# Patient Record
Sex: MALE | Race: OTHER | Employment: FULL TIME | URBAN - METROPOLITAN AREA
[De-identification: names, ages, dates, MRNs, and addresses within clinical notes are randomized per-mention and may not be internally consistent; named-entity substitution may affect disease eponyms.]

---

## 2023-07-15 ENCOUNTER — HOSPITAL ENCOUNTER (EMERGENCY)
Age: 28
Discharge: HOME | End: 2023-07-15
Attending: EMERGENCY MEDICINE
Payer: MEDICAID

## 2023-07-15 VITALS
HEART RATE: 99 BPM | DIASTOLIC BLOOD PRESSURE: 71 MMHG | OXYGEN SATURATION: 98 % | SYSTOLIC BLOOD PRESSURE: 116 MMHG | TEMPERATURE: 97.7 F | RESPIRATION RATE: 17 BRPM

## 2023-07-15 DIAGNOSIS — Z75.8 DOES NOT HAVE PRIMARY CARE PROVIDER: ICD-10-CM

## 2023-07-15 DIAGNOSIS — T50.905A ADVERSE EFFECT OF DRUG, INITIAL ENCOUNTER: Primary | ICD-10-CM

## 2023-07-15 DIAGNOSIS — F12.90 MARIJUANA USE: ICD-10-CM

## 2023-07-15 PROCEDURE — 99283 EMERGENCY DEPT VISIT LOW MDM: CPT

## 2023-07-15 ASSESSMENT — LIFESTYLE VARIABLES
HOW OFTEN DO YOU HAVE A DRINK CONTAINING ALCOHOL: NEVER
HOW MANY STANDARD DRINKS CONTAINING ALCOHOL DO YOU HAVE ON A TYPICAL DAY: PATIENT DOES NOT DRINK

## 2023-07-15 NOTE — ED PROVIDER NOTES
[facility] ENCOUNTER        Pt Name: Rubens Michelle  MRN: [id number]  [address] 1995  Date of evaluation: 7/15/2023  Provider: Andie Berger MD  PCP: No primary care provider on file. Note Started: 4:06 AM EDT 7/15/23    CHIEF COMPLAINT     I have not done edibles before and I did them for the first time and then I started thinking too much  HISTORY OF PRESENT ILLNESS: 1 or more Elements     Chief Complaint   Patient presents with    Other     Pt arrives from home via  ems from home with c/o edible side effects. Pt stated he took 200 mg of an edible around 6pm yesterday. Pt stated he thinks he has a heart thing but has not been diagnosed\"   Pt stated he feels calm but anxious      History from : Patient and Significant Other partner at bedside  Limitations to history : Altered Mental Status    Rubens Michelle is a 32 y.o. male who presents to the emergency department secondary to concern for feeling anxious. He tells me that he has not done any type of marijuana in the past and tonight he tried edibles. He states that he was initially feeling fine but then he started thinking about things like what if he falls asleep but then he is not actually falling asleep and he is dying. He denies any other history of alcohol use or drugs tonight. He denies any pain. Denies any fevers, chills, nausea, vomiting. No abdominal pain. Past medical history noted below. Aside from what is stated above denies any other symptoms or modifying factors. Nursing Notes were all reviewed and agreed with or any disagreements addressed in HPI/MDM. REVIEW OF SYSTEMS :    Review of Systems  Pertinent positive and negative findings as documented in the HPI  PAST MEDICAL HISTORY     Past Medical History:   Diagnosis Date    Constipation     Pneumonia        SURGICALHISTORY     No past surgical history on file.   CURRENT MEDICATIONS       Previous Medications

## 2023-07-15 NOTE — DISCHARGE INSTRUCTIONS
We do recommend not doing marijuana anymore in the future as you have a higher likelihood you will have more \"bad trips\". We have given you a referral to primary care since you do not currently have one listed. This is important for routine health care maintenance. The best next steps for you will be making sure you are staying well-hydrated. Return to emergency department for any new or worsening symptoms or concerns.

## 2024-07-29 ENCOUNTER — HOSPITAL ENCOUNTER (EMERGENCY)
Age: 29
Discharge: PSYCHIATRIC OR SUBSTANCE USE FACILITY | End: 2024-07-30
Attending: EMERGENCY MEDICINE
Payer: COMMERCIAL

## 2024-07-29 DIAGNOSIS — R45.851 SUICIDAL IDEATION: Primary | ICD-10-CM

## 2024-07-29 LAB
ALBUMIN SERPL-MCNC: 4.7 G/DL (ref 3.4–5)
ALBUMIN/GLOB SERPL: 1.7 {RATIO} (ref 1.1–2.2)
ALP SERPL-CCNC: 94 U/L (ref 40–129)
ALT SERPL-CCNC: 18 U/L (ref 10–40)
AMPHETAMINES UR QL SCN>1000 NG/ML: NORMAL
ANION GAP SERPL CALCULATED.3IONS-SCNC: 14 MMOL/L (ref 3–16)
APAP SERPL-MCNC: <5 UG/ML (ref 10–30)
AST SERPL-CCNC: 21 U/L (ref 15–37)
BARBITURATES UR QL SCN>200 NG/ML: NORMAL
BASOPHILS # BLD: 0 K/UL (ref 0–0.2)
BASOPHILS NFR BLD: 0.2 %
BENZODIAZ UR QL SCN>200 NG/ML: NORMAL
BILIRUB SERPL-MCNC: 0.5 MG/DL (ref 0–1)
BILIRUB UR QL STRIP.AUTO: NEGATIVE
BUN SERPL-MCNC: 14 MG/DL (ref 7–20)
CALCIUM SERPL-MCNC: 9.7 MG/DL (ref 8.3–10.6)
CANNABINOIDS UR QL SCN>50 NG/ML: NORMAL
CHLORIDE SERPL-SCNC: 100 MMOL/L (ref 99–110)
CLARITY UR: ABNORMAL
CO2 SERPL-SCNC: 23 MMOL/L (ref 21–32)
COCAINE UR QL SCN: NORMAL
COLOR UR: ABNORMAL
CREAT SERPL-MCNC: 1 MG/DL (ref 0.9–1.3)
DEPRECATED RDW RBC AUTO: 13.4 % (ref 12.4–15.4)
DRUG SCREEN COMMENT UR-IMP: NORMAL
EOSINOPHIL # BLD: 0 K/UL (ref 0–0.6)
EOSINOPHIL NFR BLD: 0.5 %
EPI CELLS #/AREA URNS HPF: NORMAL /HPF (ref 0–5)
ETHANOLAMINE SERPL-MCNC: NORMAL MG/DL (ref 0–0.08)
FENTANYL SCREEN, URINE: NORMAL
GFR SERPLBLD CREATININE-BSD FMLA CKD-EPI: >90 ML/MIN/{1.73_M2}
GLUCOSE SERPL-MCNC: 121 MG/DL (ref 70–99)
GLUCOSE UR STRIP.AUTO-MCNC: NEGATIVE MG/DL
HCT VFR BLD AUTO: 42.9 % (ref 40.5–52.5)
HGB BLD-MCNC: 14.8 G/DL (ref 13.5–17.5)
HGB UR QL STRIP.AUTO: NEGATIVE
KETONES UR STRIP.AUTO-MCNC: 15 MG/DL
LEUKOCYTE ESTERASE UR QL STRIP.AUTO: NEGATIVE
LYMPHOCYTES # BLD: 1.4 K/UL (ref 1–5.1)
LYMPHOCYTES NFR BLD: 17.5 %
MCH RBC QN AUTO: 30.9 PG (ref 26–34)
MCHC RBC AUTO-ENTMCNC: 34.6 G/DL (ref 31–36)
MCV RBC AUTO: 89.3 FL (ref 80–100)
METHADONE UR QL SCN>300 NG/ML: NORMAL
MONOCYTES # BLD: 0.6 K/UL (ref 0–1.3)
MONOCYTES NFR BLD: 7.9 %
NEUTROPHILS # BLD: 5.9 K/UL (ref 1.7–7.7)
NEUTROPHILS NFR BLD: 73.9 %
NITRITE UR QL STRIP.AUTO: NEGATIVE
OPIATES UR QL SCN>300 NG/ML: NORMAL
OXYCODONE UR QL SCN: NORMAL
PCP UR QL SCN>25 NG/ML: NORMAL
PH UR STRIP.AUTO: 6 [PH] (ref 5–8)
PH UR STRIP: 6 [PH]
PLATELET # BLD AUTO: 192 K/UL (ref 135–450)
PMV BLD AUTO: 9.9 FL (ref 5–10.5)
POTASSIUM SERPL-SCNC: 3.6 MMOL/L (ref 3.5–5.1)
PROT SERPL-MCNC: 7.4 G/DL (ref 6.4–8.2)
PROT UR STRIP.AUTO-MCNC: ABNORMAL MG/DL
RBC # BLD AUTO: 4.81 M/UL (ref 4.2–5.9)
RBC #/AREA URNS HPF: NORMAL /HPF (ref 0–4)
SALICYLATES SERPL-MCNC: <0.3 MG/DL (ref 15–30)
SODIUM SERPL-SCNC: 137 MMOL/L (ref 136–145)
SP GR UR STRIP.AUTO: 1.02 (ref 1–1.03)
UA COMPLETE W REFLEX CULTURE PNL UR: ABNORMAL
UA DIPSTICK W REFLEX MICRO PNL UR: YES
URN SPEC COLLECT METH UR: ABNORMAL
UROBILINOGEN UR STRIP-ACNC: 1 E.U./DL
WBC # BLD AUTO: 8 K/UL (ref 4–11)
WBC #/AREA URNS HPF: NORMAL /HPF (ref 0–5)

## 2024-07-29 PROCEDURE — 90714 TD VACC NO PRESV 7 YRS+ IM: CPT | Performed by: PHYSICIAN ASSISTANT

## 2024-07-29 PROCEDURE — 80307 DRUG TEST PRSMV CHEM ANLYZR: CPT

## 2024-07-29 PROCEDURE — 80053 COMPREHEN METABOLIC PANEL: CPT

## 2024-07-29 PROCEDURE — 6360000002 HC RX W HCPCS: Performed by: PHYSICIAN ASSISTANT

## 2024-07-29 PROCEDURE — 80179 DRUG ASSAY SALICYLATE: CPT

## 2024-07-29 PROCEDURE — 90471 IMMUNIZATION ADMIN: CPT | Performed by: PHYSICIAN ASSISTANT

## 2024-07-29 PROCEDURE — 99285 EMERGENCY DEPT VISIT HI MDM: CPT

## 2024-07-29 PROCEDURE — 82077 ASSAY SPEC XCP UR&BREATH IA: CPT

## 2024-07-29 PROCEDURE — 90791 PSYCH DIAGNOSTIC EVALUATION: CPT | Performed by: SOCIAL WORKER

## 2024-07-29 PROCEDURE — 85025 COMPLETE CBC W/AUTO DIFF WBC: CPT

## 2024-07-29 PROCEDURE — 80143 DRUG ASSAY ACETAMINOPHEN: CPT

## 2024-07-29 PROCEDURE — 81001 URINALYSIS AUTO W/SCOPE: CPT

## 2024-07-29 RX ORDER — POLYETHYLENE GLYCOL 3350 17 G
2 POWDER IN PACKET (EA) ORAL
Status: CANCELLED | OUTPATIENT
Start: 2024-07-29

## 2024-07-29 RX ORDER — TRAZODONE HYDROCHLORIDE 50 MG/1
50 TABLET ORAL NIGHTLY PRN
Status: CANCELLED | OUTPATIENT
Start: 2024-07-29

## 2024-07-29 RX ORDER — HYDROXYZINE HYDROCHLORIDE 25 MG/1
50 TABLET, FILM COATED ORAL 3 TIMES DAILY PRN
Status: CANCELLED | OUTPATIENT
Start: 2024-07-29

## 2024-07-29 RX ORDER — LORAZEPAM 1 MG/1
1 TABLET ORAL
Status: CANCELLED | OUTPATIENT
Start: 2024-07-29 | End: 2024-07-30

## 2024-07-29 RX ORDER — ACETAMINOPHEN 325 MG/1
650 TABLET ORAL EVERY 8 HOURS PRN
Status: CANCELLED | OUTPATIENT
Start: 2024-07-29

## 2024-07-29 RX ADMIN — CLOSTRIDIUM TETANI TOXOID ANTIGEN (FORMALDEHYDE INACTIVATED) AND CORYNEBACTERIUM DIPHTHERIAE TOXOID ANTIGEN (FORMALDEHYDE INACTIVATED) 0.5 ML: 5; 2 INJECTION, SUSPENSION INTRAMUSCULAR at 18:29

## 2024-07-29 ASSESSMENT — LIFESTYLE VARIABLES
HOW OFTEN DO YOU HAVE A DRINK CONTAINING ALCOHOL: 2-4 TIMES A MONTH
HOW MANY STANDARD DRINKS CONTAINING ALCOHOL DO YOU HAVE ON A TYPICAL DAY: 1 OR 2

## 2024-07-29 ASSESSMENT — PAIN SCALES - GENERAL: PAINLEVEL_OUTOF10: 0

## 2024-07-29 ASSESSMENT — PAIN - FUNCTIONAL ASSESSMENT: PAIN_FUNCTIONAL_ASSESSMENT: 0-10

## 2024-07-29 NOTE — VIRTUAL HEALTH
Gaurav Ashraf  [id number]  1995     Social Work Behavioral Health Crisis Assessment    07/29/24    Chief Complaint: Pt after an argument with his GF wrote a suicide note and had a panic attack.    HPI: Patient is a 28 y.o.  /  male who presents for depressive symptoms and in need of a mental health exam. Patient presented to the ED on 07/29/24 from community.    Past Psychiatric History:  Previous Diagnoses/symptoms: ADHD and Emotional Behavioral Issues 6-10 yrs old   Previous Suicide Attempts/ self harm : yes in 2018 had an interrupted attempt was planning to sit on the train tracks - decided to check his phone and Pt reports today and yesterday he self harmed cut his thighs and arms \" nothing deep\".  Inpatient psychiatric hospitalizations: no  Current outpatient psychiatric provider: Denies  Current therapist: States not in therapy  Previous psychiatric medication trials: No prior medication trials  Current psychiatric medications: No current psychiatric medications  Family Psychiatric History: dad had a behavioral issues and out bursts     Sleep Hours: 3-4 hrs this past week     Sleep concerns: difficulty attaining sleep    Use of sleep medications: denies    Substance Abuse History:  Tobacco: Endorses smoke cigarettes   Alcohol: Endorses \" occasionally\"  Marijuana: Endorses \" I tried it and had a bad reaction\"  Stimulant: Denies  Opiates: Denies  Benzodiazepine: Denies  Other illicit drug usage: Denies  History of substance/alcohol abuse treatment: Denies    Social History:  Education: 11th grade dropped out    Living Situation/Interest: lives with partner GF   Marital/Committed relationship and parenting hx: single  Occupation: works at [employer]   Legal History/Hx of Violence: Denies currently as a small child had outbursts   Spiritual History: Denies  Psychological trauma, neglect, or abuse: denies hx of trauma/abuse   Access to guns or other weapons: denies having access to

## 2024-07-29 NOTE — ED NOTES
Pt will now be on a hold  Room made safe with non essential equipment removed  Pt removed all clothing and belongings  Placed in gown without ties  Pt belongings inventoried collected    Pt provided non slip socks  Safety tray added  Sitter at bedside.

## 2024-07-29 NOTE — ED PROVIDER NOTES
EMERGENCY MEDICINE ATTENDING NOTE  Varun Evans Jr., CHINO STEWARD, [place]ASANTA        I personally saw the patient and made/approved the management plan and take responsibility for the patient management on Gaurav Ashraf.  All diagnostic, treatment, and disposition decisions were made by myself in conjunction with the advanced practice provider.  I have participated in the medical decision making and directed the treatment plan and disposition of the patient.    For further details of Gaurav Ashraf's emergency department encounter, please see the advanced practice provider's documentation.    I, Varun Evans Jr., CHINO STEWARD, MONSE, am the primary physician provider of record.      CHIEF COMPLAINT  Chief Complaint   Patient presents with    Suicidal     Pt brought in per [facility] Police pt reports stress with his partner and he wrote a letter stating he wanted to kill himself, pt also has superficial cuts to his left forearm.  No current plan for SI, feels sad and depressed.  No HI.       BRIEF HISTORY  Gaurav Ashraf is a 28 y.o. male  who presents to the ED for evaluation of suicidal ideation.  Patient states he is in an open relationship with his girlfriend.  States he found out that she actually did have relations with someone else.  States he could not handle it and became very despondent.  States he wanted to kill himself.  Even wrote a note saying that he wanted to kill himself.  Did make some superficial cuts yesterday and today but states he could not go through with it.  Stating he does not really feel suicidal right now but realizes he needs help.    BRIEF/FOCUSED PHYSICAL EXAMINATION  VITAL SIGNS:    ED Triage Vitals   Enc Vitals Group      BP 07/29/24 1556 138/86      Pulse 07/29/24 1556 89      Respirations 07/29/24 1556 15      Temp --       Temp src --       SpO2 07/29/24 1556 97 %      Weight - Scale 07/29/24 1608 74.8 kg (165 lb)      Height 07/29/24 1608 1.803 m (5' 11\")      Head

## 2024-07-29 NOTE — ED PROVIDER NOTES
[facility] EMERGENCY DEPARTMENT  EMERGENCY DEPARTMENT ENCOUNTER        Pt Name: Gaurav Ashraf  MRN: [id number]  Birthdate 1995  Date of evaluation: 7/29/2024  Provider: Clay Hollis PA-C  PCP: No primary care provider on file.  Note Started: 5:11 PM EDT 7/29/24       I have seen and evaluated this patient with my supervising physician Varun Evans DO.      CHIEF COMPLAINT       Chief Complaint   Patient presents with    Suicidal     Pt brought in per [facility] Police pt reports stress with his partner and he wrote a letter stating he wanted to kill himself, pt also has superficial cuts to his left forearm.  No current plan for SI, feels sad and depressed.  No HI.       HISTORY OF PRESENT ILLNESS: 1 or more Elements     History From: patient  Limitations to history : None    Gaurav Ashraf is a 28 y.o. male who presents to the emergency department after police were called.  Patient currently lives with his partner and apparently left a note about killing himself and partner called patient's mother who called the police and brought him here.  Patient states that his partner and him live together and are in an open relationship and he became jealous yesterday and actually cut his left leg.  These are all superficial wounds that he is unsure of his last tetanus vaccination.  States he has been feeling very jealous about this and then cut his left forearm today.  These again are all superficial.  Denies any physical pain.  He states he was very upset about what happened and left a note but never had a plan to hurt himself and currently does not have a plan to hurt himself.  He states he began thinking about all the people that he would actually hurt if he killed himself and states he would be unable to do this and does not want to hurt himself.  He states that he does have a long history of some auditory hallucinations when he is very stressed and will just hear noises and denies any

## 2024-07-30 ENCOUNTER — HOSPITAL ENCOUNTER (INPATIENT)
Age: 29
LOS: 1 days | Discharge: HOME | DRG: 881 | End: 2024-07-31
Attending: PSYCHIATRY & NEUROLOGY | Admitting: PSYCHIATRY & NEUROLOGY
Payer: COMMERCIAL

## 2024-07-30 VITALS
HEART RATE: 70 BPM | TEMPERATURE: 97.4 F | DIASTOLIC BLOOD PRESSURE: 66 MMHG | OXYGEN SATURATION: 96 % | WEIGHT: 165 LBS | SYSTOLIC BLOOD PRESSURE: 105 MMHG | BODY MASS INDEX: 23.1 KG/M2 | HEIGHT: 71 IN | RESPIRATION RATE: 16 BRPM

## 2024-07-30 PROBLEM — F32.A DEPRESSION, UNSPECIFIED: Status: ACTIVE | Noted: 2024-07-30

## 2024-07-30 PROCEDURE — 99221 1ST HOSP IP/OBS SF/LOW 40: CPT

## 2024-07-30 PROCEDURE — 1240000000 HC EMOTIONAL WELLNESS R&B

## 2024-07-30 PROCEDURE — 99223 1ST HOSP IP/OBS HIGH 75: CPT

## 2024-07-30 PROCEDURE — 6370000000 HC RX 637 (ALT 250 FOR IP)

## 2024-07-30 RX ORDER — POLYETHYLENE GLYCOL 3350 17 G
2 POWDER IN PACKET (EA) ORAL
Status: DISCONTINUED | OUTPATIENT
Start: 2024-07-30 | End: 2024-07-31 | Stop reason: HOSPADM

## 2024-07-30 RX ORDER — TRAZODONE HYDROCHLORIDE 50 MG/1
50 TABLET ORAL NIGHTLY PRN
Status: DISCONTINUED | OUTPATIENT
Start: 2024-07-30 | End: 2024-07-31 | Stop reason: HOSPADM

## 2024-07-30 RX ORDER — ACETAMINOPHEN 325 MG/1
650 TABLET ORAL EVERY 8 HOURS PRN
Status: DISCONTINUED | OUTPATIENT
Start: 2024-07-30 | End: 2024-07-31 | Stop reason: HOSPADM

## 2024-07-30 RX ORDER — CITALOPRAM 20 MG/1
10 TABLET ORAL DAILY
Status: DISCONTINUED | OUTPATIENT
Start: 2024-07-30 | End: 2024-07-31 | Stop reason: HOSPADM

## 2024-07-30 RX ORDER — HYDROXYZINE 50 MG/1
50 TABLET, FILM COATED ORAL 3 TIMES DAILY PRN
Status: DISCONTINUED | OUTPATIENT
Start: 2024-07-30 | End: 2024-07-31 | Stop reason: HOSPADM

## 2024-07-30 RX ORDER — LORAZEPAM 1 MG/1
1 TABLET ORAL
Status: ACTIVE | OUTPATIENT
Start: 2024-07-30 | End: 2024-07-31

## 2024-07-30 RX ADMIN — CITALOPRAM HYDROBROMIDE 10 MG: 20 TABLET ORAL at 17:30

## 2024-07-30 ASSESSMENT — PATIENT HEALTH QUESTIONNAIRE - PHQ9
SUM OF ALL RESPONSES TO PHQ QUESTIONS 1-9: 2
SUM OF ALL RESPONSES TO PHQ9 QUESTIONS 1 & 2: 2
SUM OF ALL RESPONSES TO PHQ QUESTIONS 1-9: 2
1. LITTLE INTEREST OR PLEASURE IN DOING THINGS: SEVERAL DAYS
SUM OF ALL RESPONSES TO PHQ QUESTIONS 1-9: 2
SUM OF ALL RESPONSES TO PHQ9 QUESTIONS 1 & 2: 2
1. LITTLE INTEREST OR PLEASURE IN DOING THINGS: SEVERAL DAYS
SUM OF ALL RESPONSES TO PHQ QUESTIONS 1-9: 2
2. FEELING DOWN, DEPRESSED OR HOPELESS: SEVERAL DAYS

## 2024-07-30 ASSESSMENT — SLEEP AND FATIGUE QUESTIONNAIRES
DO YOU HAVE DIFFICULTY SLEEPING: YES
AVERAGE NUMBER OF SLEEP HOURS: 4
SLEEP PATTERN: DIFFICULTY FALLING ASLEEP;DISTURBED/INTERRUPTED SLEEP
DO YOU HAVE DIFFICULTY SLEEPING: YES
DO YOU USE A SLEEP AID: NO
AVERAGE NUMBER OF SLEEP HOURS: 4
SLEEP PATTERN: DIFFICULTY FALLING ASLEEP;DISTURBED/INTERRUPTED SLEEP
DO YOU USE A SLEEP AID: NO

## 2024-07-30 ASSESSMENT — LIFESTYLE VARIABLES
HOW MANY STANDARD DRINKS CONTAINING ALCOHOL DO YOU HAVE ON A TYPICAL DAY: 1 OR 2
HOW OFTEN DO YOU HAVE A DRINK CONTAINING ALCOHOL: 2-4 TIMES A MONTH

## 2024-07-30 ASSESSMENT — PAIN SCALES - GENERAL: PAINLEVEL_OUTOF10: 0

## 2024-07-30 NOTE — BH NOTE
Patient visible and social on the unit. Started Celexa today - educated on medication, denies any questions or concerns. States mood is euthymic and he is feeling overall improved in mood. Denies SI.

## 2024-07-30 NOTE — PROGRESS NOTES
Gaurav presented to [facility] ED with police after he wrote a letter stating that he wanted to kill himself.     Gaurav has been in an open relationship with his girlfriend for 3 years. He recently became jealous when he found out that she had relations with someone else. He inflicted superficial cuts on his left forearm & left leg and then wrote the note. After he wrote the note, he called a friend to come pick him up.     When his girlfriend went home and found the note that Gaurav left, she called Gaurav' mother & his mother called the police.     Gaurav reports that he has a long history of auditory hallucinations & reports that he mainly hears voices when he is stressed. He denies visual or tactile hallucinations.     Gaurav reports that he struggled with ADHD & behavioral issues from the ages of 6 - 10. He reports that he used to see a therapist when he was younger, but has never been prescribed medications.    Gaurav reports that he has never attempted suicide before & has never had an inpatient psychiatric admission.     Upon admission to this unit, Gaurav is pleasant and friendly. He reports that he has a good support system - his mom and sister live close and his co-workers are \"great\". Gaurav told this writer that he was texting with his girlfriend while he was at [facility], and based on those text messages, he believes that they can work things out. Gaurav told this writer that he feels \"fine\" now and hopes he isn't here for more than a few days. Gaurav did report financial stress and is concerned about missing work. He states that they received an eviction notice last month, but was able to pay the rent. He's nervous that if he misses work, they will receive another notice.     Gaurav denies SI, HI, and AVH.

## 2024-07-30 NOTE — ED NOTES
Transfer Center Handoff for Behavioral Health Transfers      Patient's Current Location: [facility] EMERGENCY DEPARTMENT     Chief Complaint   Patient presents with    Suicidal     Pt brought in per [facility] Police pt reports stress with his partner and he wrote a letter stating he wanted to kill himself, pt also has superficial cuts to his left forearm.  No current plan for SI, feels sad and depressed.  No HI.       Current or History of Violent Behavior: No    Currently in Restraints Now or During this Encounter: No  (Specify if Agitation or self harm is noted in ED?)  If yes, please describe behaviors requiring restraint:             Medical Clearance Documented and Verified in the Chart: No    No Known Allergies     Can Patient Tolerate Lying Flat: Yes    Able to Perform ADLs:  Yes  (Specify if able to ambulate or uses any mobility devices such as cane or walker)  Activity: In bed  Level of Assistance:    Assistive Device:    Miscellaneous Devices:      LABS    CBC:   Lab Results   Component Value Date/Time    WBC 8.0 07/29/2024 06:08 PM    RBC 4.81 07/29/2024 06:08 PM    HGB 14.8 07/29/2024 06:08 PM    HCT 42.9 07/29/2024 06:08 PM    MCV 89.3 07/29/2024 06:08 PM    MCH 30.9 07/29/2024 06:08 PM    MCHC 34.6 07/29/2024 06:08 PM    RDW 13.4 07/29/2024 06:08 PM     07/29/2024 06:08 PM    MPV 9.9 07/29/2024 06:08 PM     CMP:   Lab Results   Component Value Date/Time     07/29/2024 06:08 PM    K 3.6 07/29/2024 06:08 PM     07/29/2024 06:08 PM    CO2 23 07/29/2024 06:08 PM    BUN 14 07/29/2024 06:08 PM    CREATININE 1.0 07/29/2024 06:08 PM    AGRATIO 1.7 07/29/2024 06:08 PM    LABGLOM >90 07/29/2024 06:08 PM    GLUCOSE 121 07/29/2024 06:08 PM    CALCIUM 9.7 07/29/2024 06:08 PM    BILITOT 0.5 07/29/2024 06:08 PM    ALKPHOS 94 07/29/2024 06:08 PM    AST 21 07/29/2024 06:08 PM    ALT 18 07/29/2024 06:08 PM     Drug Panel: No results found for: \"AMPHETAMUR\", \"BARBITURATUR\", \"COCAINEUR\",

## 2024-07-30 NOTE — ED NOTES
Assumed care to given report. Report given to [facility] to be transferred to [facility].

## 2024-07-30 NOTE — PROGRESS NOTES
CSSR-S Assessment completed with patient who then scored HIGH RISK.     Provider Dr. Leone was notified of HIGH RISK score, via Telephone at 0247.      Were suicide precautions ordered: NO    If not ordered, justification as follows: Gaurav denies current suicidal ideation, plan, and intent. He states that he believes that he can demonstrate safe behavior on this unit and is willing to reach out to staff if he feels that he cannot maintain safe behavior at any point during this admission.     Completed by: Rhonda AGUERO RN

## 2024-07-30 NOTE — GROUP NOTE
Group Therapy Note    Date: 7/30/2024    Group Start Time: 1100  Group End Time: 1145  Group Topic: Psychoeducation    [facility]    Denise Seymour RT        Group Therapy Note    Attendees: 12    Psych Education session was centered on third spaces and the many wellness benefits they provide.  Group processed what a third space is as well as various examples of indoor, outdoor, and digital third spaces.  Participants were given a handout to self reflect on current places they enjoy as well as healthy activities of interest.  Group discussed how group therapy is a third space and how to implement more third spaces into their lives.      Notes:  Pt was present and engaged across session.  Fully participated in activity and shared input during group discussion.  Receptive to information and met goal for group.    Status After Intervention:  Improved    Participation Level: Active Listener and Interactive    Participation Quality: Appropriate, Attentive, and Sharing      Speech:  normal      Thought Process/Content: Logical      Affective Functioning: Congruent      Mood: depressed      Level of consciousness:  Alert, Oriented x4, and Attentive      Response to Learning: Able to verbalize current knowledge/experience, Able to verbalize/acknowledge new learning, Capable of insight, and Progressing to goal      Endings: None Reported    Modes of Intervention: Education, Support, Socialization, Exploration, Clarifying, Problem-solving, and Activity      Discipline Responsible: Psychoeducational Specialist and Recreational Therapist      Signature:  Denise Seymour MA, CTRS

## 2024-07-30 NOTE — PROGRESS NOTES
Home Medication Reconciliation Status          [x] COMPLETE       Medication history has been reviewed and obtained from the following source(s):       [x] patient/family verbal report             [] patient/family provided written list       [] external pharmacy   [] external facility list         []  Provider notified that home medication reconciliation is complete          [] IN PROGRESS       Medication reconciliation marked in progress at this time due to:       [] patient/family poor historian      [] waiting arrival of family to clarify       [] waiting for accurate list        [] external pharmacy needs called      * Follow up is needed.          [] UNABLE TO ASSESS       Medication reconciliation is incomplete and unable to assess at this time due to:       [] critical patient condition   [] patient is unresponsive        [] no family available                       [] unknown pharmacy       [] anonymous patient          * Follow up is needed.      [] Pharmacy consult placed for medication reconciliation assistance   Additional comments: Gaurav reports that he is not currently prescribed medications and therefore does not take any medications.

## 2024-07-30 NOTE — GROUP NOTE
Group Therapy Note    Date: 7/30/2024    Group Start Time: 1000  Group End Time: 1050  Group Topic: Psychoeducation    [facility]    Sendy Espitia LISW        Group Therapy Note    Attendees: 11       Patient's Goal:  to learn and discuss the importance of having a support system after discharge. Pt's asked to identify their support systems and also given resources for support after discharge.     Notes:  pt attended group for the full duration. He actively participated in group discussion and was able to apply to himself.    Status After Intervention:  Improved    Participation Level: Active Listener and Interactive    Participation Quality: Appropriate, Attentive, and Sharing      Speech:  normal      Thought Process/Content: Logical      Affective Functioning: Congruent      Mood: depressed      Level of consciousness:  Alert, Oriented x4, and Attentive      Response to Learning: Able to verbalize current knowledge/experience, Able to verbalize/acknowledge new learning, and Progressing to goal      Endings: None Reported    Modes of Intervention: Education, Support, Socialization, Exploration, and Clarifying      Discipline Responsible: /Counselor      Signature:  MARGO Beard

## 2024-07-30 NOTE — ED NOTES
Report called to Leroy YOUNG. Pt transferring to room 2316 at [facility] per [facility].

## 2024-07-30 NOTE — PROGRESS NOTES
Gaurav arrived on this unit from [facility] ED with two medical transporters and one security staff from this facility at 0140. Gaurav is ambulatory with a steady gait upon arrival. Security staff performed a thorough assessment of Gaurav and his belongings with a metal detector wand and no contraband was found in his belongings or on his person. Gaurav is alert and oriented x4, calm, and cooperative. He denies current suicidal and homicidal ideation as well as auditory, visual, and tactile hallucinations. Gaurav was provided with a snack and fluids, per his request. This writer oriented Gaurav to this unit and his room. Gaurav is agreeable to participate in the admission process.

## 2024-07-30 NOTE — PROGRESS NOTES
Behavioral Services  Medicare Certification Upon Admission    I certify that this patient's inpatient psychiatric hospital admission is medically necessary for:    [x] (1) Treatment which could reasonably be expected to improve this patient's condition,       [x] (2) Or for diagnostic study;     AND     [x](2) The inpatient psychiatric services are provided while the individual is under the care of a physician and are included in the individualized plan of care.    Estimated length of stay/service 2-5 days    Plan for post-hospital care outpatient support    Electronically signed by PATRICK Burns CNP on 7/30/2024 at 9:08 AM

## 2024-07-30 NOTE — H&P
INITIAL PSYCHIATRIC HISTORY AND PHYSICAL      Patient name: Gaurav Ashraf  Admit date: 7/30/2024  Today's date: 7/30/2024           CC:  Depression    HPI:   Patient seen in room on Adult Behavioral Unit.   Patient is a 28 y.o. male who presents  to  ED  for depressive symptoms and in need of a mental health exam. Per Tele-health notes:  \"Pt was brought in by a , pt reports he felt his relationship with gf was ending and they have been together for 3 yrs through different challenges in life ie: homelessness. Pt reports after an argument with his live in  over their open relationship agreement. Pt became jealous of her having a new interest and he also sought new interest. GF found new interest at there apartment, also became upset.Pt reports that GF stated it was over and left the apartment and returned to a girlfriends home..   Pt endorses he then wrote a suicide note to the gf.     Pt endorses he wrote he was going to kill himself and genuinely felt that way at the time. Pt endorses leaving the home after writing the note and sat alone thinking of various ways and intent to kill himself\" I didn't have a plan and I kept thinking of ways to do it\". Pt endorses a severe panic attack during this time and states \" It hard to reason with me and I get emotional and I want everything ok and I panic more, hard to breathe and in those moments I am over emotional\". Pt endorses he wasn't in a good space over the past week. Pt endorses this week decreased sleep of 3-4 hours. Pt has been cutting on yesterday with a razor to his thighs and today with a razor to arm . Pt endorses theses are superficial but during the time he was cutting it was an intent to harm himself but couldn't bring himself to cu deeper. Pt then changed and  he was cutting to feel better.  Pt has a psychiatric hx of ADHD and Behavior issues at age 6-10 last treated. Pt has a hx of self injurious behavior and reported last time self 
07:55 PM       CULTURES  Results       ** No results found for the last 336 hours. **            EKG:    No results found for this or any previous visit (from the past 4464 hour(s)).     RADIOLOGY  No orders to display         ASSESSMENT/PLAN:  Suicidal ideation  Depression   - per psychiatry team    Self inflicted lacerations to left forearm and thigh  - no laceration repair needed per ED, no s/sx of infection   - given tdap in ED    Pt has no medical complaints at this time. They were informed that should a medical concern arise during their admission they may have [facility] contact us.      Mery Lange PA-C   7/30/2024 2:13 PM

## 2024-07-30 NOTE — PLAN OF CARE
Problem: Depression/Self Harm  Goal: Effect of psychiatric condition will be minimized and patient will be protected from self harm  Description: INTERVENTIONS:  1. Assess impact of patient's symptoms on level of functioning, self care needs and offer support as indicated  2. Assess patient/family knowledge of depression, impact on illness and need for teaching  3. Provide emotional support, presence and reassurance  4. Assess for possible suicidal thoughts or ideation. If patient expresses suicidal thoughts or statements do not leave alone, initiate Suicide Precautions, move to a room close to the nursing station and obtain sitter  5. Initiate consults as appropriate with Mental Health Professional, Spiritual Care, Psychosocial CNS, and consider a recommendation to the LIP for a Psychiatric Consultation  Outcome: Progressing     Patient denies suicidal ideation. Q15 minute checks continue per policy for patient safety.

## 2024-07-30 NOTE — PLAN OF CARE
Problem: Self Harm/Suicidality  Goal: Will have no self-injury during hospital stay  Description: INTERVENTIONS:  1.  Ensure constant observer at bedside with Q15M safety checks  2.  Maintain a safe environment  3.  Secure patient belongings  4.  Ensure family/visitors adhere to safety recommendations  5.  Ensure safety tray has been added to patient's diet order  6.  Every shift and PRN: Re-assess suicidal risk via Frequent Screener    Outcome: Completed     Problem: Anxiety  Goal: Will report anxiety at manageable levels  Description: INTERVENTIONS:  1. Administer medication as ordered  2. Teach and rehearse alternative coping skills  3. Provide emotional support with 1:1 interaction with staff  Outcome: Progressing     Problem: Coping  Goal: Pt/Family able to verbalize concerns and demonstrate effective coping strategies  Description: INTERVENTIONS:  1. Assist patient/family to identify coping skills, available support systems and cultural and spiritual values  2. Provide emotional support, including active listening and acknowledgement of concerns of patient and caregivers  3. Reduce environmental stimuli, as able  4. Instruct patient/family in relaxation techniques, as appropriate  5. Assess for spiritual pain/suffering and initiate Spiritual Care, Psychosocial Clinical Specialist consults as needed  Outcome: Progressing     Problem: Depression/Self Harm  Goal: Effect of psychiatric condition will be minimized and patient will be protected from self harm  Description: INTERVENTIONS:  1. Assess impact of patient's symptoms on level of functioning, self care needs and offer support as indicated  2. Assess patient/family knowledge of depression, impact on illness and need for teaching  3. Provide emotional support, presence and reassurance  4. Assess for possible suicidal thoughts or ideation. If patient expresses suicidal thoughts or statements do not leave alone, initiate Suicide Precautions, move to a room

## 2024-07-30 NOTE — PLAN OF CARE
[facility]  Treatment Team Note  Review Date & Time: 07/30/24  0955    Patient was not in treatment team      Status EXAM:   Mental Status and Behavioral Exam  Normal: No  Level of Assistance: Independent/Self  Facial Expression: Worried, Flat  Affect: Congruent  Level of Consciousness: Alert  Frequency of Checks: 4 times per hour, close  Mood:Normal: No  Mood: Depressed, Anxious  Motor Activity:Normal: Yes  Eye Contact: Good  Observed Behavior: Cooperative, Friendly  Sexual Misconduct History: Current - no  Preception: [place] to person, [place] to time, [place] to place, [place] to situation  Attention:Normal: Yes  Thought Processes: Unremarkable  Thought Content:Normal: Yes  Depression Symptoms: Feelings of helplessness, Feelings of hopelessess, Appetite change, Sleep disturbance  Anxiety Symptoms: Generalized  Felisha Symptoms: No problems reported or observed.  Hallucinations: None (Gaurav denies; not noted to be RTIS)  Delusions: No  Memory:Normal: Yes  Insight and Judgment: No  Insight and Judgment: Poor judgment, Poor insight      Suicide Risk CSSR-S:  1) Within the past month, have you wished you were dead or wished you could go to sleep and not wake up? : Yes  2) Have you actually had any thoughts of killing yourself? : Yes  3) Have you been thinking about how you might kill yourself? : Yes  5) Have you started to work out or worked out the details of how to kill yourself? Do you intend to carry out this plan? : No  6) Have you ever done anything, started to do anything, or prepared to do anything to end your life?: Yes      PLAN/TREATMENT RECOMMENDATIONS UPDATE:   Patient will take medication as prescribed, eat 75% of meals, attend groups, participate in milieu activities, participate in treatment team and care planning for discharge and follow up.           Maday Keenan RN

## 2024-07-30 NOTE — PROGRESS NOTES
4 Eyes Skin Assessment     NAME:  Gaurav Ashraf  YOB: 1995  MEDICAL RECORD NUMBER:  [id number]    The patient is being assessed for  Admission    I agree that at least one RN has performed a thorough Head to Toe Skin Assessment on the patient. ALL assessment sites listed below have been assessed.      Areas assessed by both nurses:    Head, Face, Ears, Shoulders, Back, Chest, Arms, Elbows, Hands, Sacrum. Buttock, Coccyx, Ischium, and Legs. Feet and Heels        Does the Patient have a Wound? No noted wound(s)       Jesús Prevention initiated by RN: No  Wound Care Orders initiated by RN: No    Pressure Injury (Stage 3,4, Unstageable, DTI, NWPT, and Complex wounds) if present, place Wound referral order by RN under : No    New Ostomies, if present place, Ostomy referral order under : No     Nurse 1 eSignature: Electronically signed by Rhonda Kingston RN on 7/30/24 at 3:21 AM EDT    **SHARE this note so that the co-signing nurse can place an eSignature**    Nurse 2 eSignature: Electronically signed by Yuko Toledo RN on 7/30/24 at 3:48 AM EDT

## 2024-07-30 NOTE — PROGRESS NOTES
[facility]  Admission Note     Admission Type:   Admission Type: Voluntary    Reason for admission:  Reason for Admission: Suicidal Ideation      Addictive Behavior:   Addictive Behavior  In the Past 3 Months, Have You Felt or Has Someone Told You That You Have a Problem With  : None    Medical Problems:   Past Medical History:   Diagnosis Date    Constipation     Pneumonia        Status EXAM:  Mental Status and Behavioral Exam  Normal: No  Level of Assistance: Independent/Self  Facial Expression: Brightened, Worried  Affect: Appropriate  Level of Consciousness: Alert  Frequency of Checks: 4 times per hour, close  Mood:Normal: No  Mood: Depressed, Anxious  Motor Activity:Normal: Yes  Eye Contact: Good  Observed Behavior: Cooperative, Friendly  Sexual Misconduct History: Current - no  Preception: [place] to person, [place] to time, [place] to place, [place] to situation  Attention:Normal: Yes  Thought Processes: Unremarkable  Thought Content:Normal: Yes  Depression Symptoms: Feelings of helplessness, Feelings of hopelessess, Appetite change, Sleep disturbance  Anxiety Symptoms: Generalized  Felisha Symptoms: No problems reported or observed.  Hallucinations: None  Delusions: No  Memory:Normal: Yes  Insight and Judgment: No  Insight and Judgment: Poor judgment, Poor insight    Tobacco Screening:  Practical Counseling, on admission, emily X, if applicable and completed (first 3 are required if patient doesn't refuse):            ( ) Recognizing danger situations (included triggers and roadblocks)                    ( ) Coping skills (new ways to manage stress,relaxation techniques, changing routine, distraction)                                                           ( ) Basic information about quitting (benefits of quitting, techniques in how to quit, available resources  ( ) [facility]

## 2024-07-31 VITALS
HEIGHT: 71 IN | BODY MASS INDEX: 23.1 KG/M2 | OXYGEN SATURATION: 99 % | RESPIRATION RATE: 16 BRPM | HEART RATE: 72 BPM | DIASTOLIC BLOOD PRESSURE: 81 MMHG | TEMPERATURE: 98.2 F | SYSTOLIC BLOOD PRESSURE: 131 MMHG | WEIGHT: 165 LBS

## 2024-07-31 PROCEDURE — 6370000000 HC RX 637 (ALT 250 FOR IP)

## 2024-07-31 PROCEDURE — 5130000000 HC BRIDGE APPOINTMENT

## 2024-07-31 PROCEDURE — 99239 HOSP IP/OBS DSCHRG MGMT >30: CPT

## 2024-07-31 RX ORDER — CITALOPRAM HYDROBROMIDE 10 MG/1
10 TABLET ORAL DAILY
Qty: 30 TABLET | Refills: 0 | Status: SHIPPED | OUTPATIENT
Start: 2024-08-01

## 2024-07-31 RX ADMIN — CITALOPRAM HYDROBROMIDE 10 MG: 20 TABLET ORAL at 09:05

## 2024-07-31 NOTE — PLAN OF CARE
Problem: Anxiety  Goal: Will report anxiety at manageable levels  Description: INTERVENTIONS:  1. Administer medication as ordered  2. Teach and rehearse alternative coping skills  3. Provide emotional support with 1:1 interaction with staff  Outcome: Progressing     Problem: Coping  Goal: Pt/Family able to verbalize concerns and demonstrate effective coping strategies  Description: INTERVENTIONS:  1. Assist patient/family to identify coping skills, available support systems and cultural and spiritual values  2. Provide emotional support, including active listening and acknowledgement of concerns of patient and caregivers  3. Reduce environmental stimuli, as able  4. Instruct patient/family in relaxation techniques, as appropriate  5. Assess for spiritual pain/suffering and initiate Spiritual Care, Psychosocial Clinical Specialist consults as needed  Outcome: Progressing     Problem: Depression/Self Harm  Goal: Effect of psychiatric condition will be minimized and patient will be protected from self harm  Description: INTERVENTIONS:  1. Assess impact of patient's symptoms on level of functioning, self care needs and offer support as indicated  2. Assess patient/family knowledge of depression, impact on illness and need for teaching  3. Provide emotional support, presence and reassurance  4. Assess for possible suicidal thoughts or ideation. If patient expresses suicidal thoughts or statements do not leave alone, initiate Suicide Precautions, move to a room close to the nursing station and obtain sitter  5. Initiate consults as appropriate with Mental Health Professional, Spiritual Care, Psychosocial CNS, and consider a recommendation to the [facility] for a Psychiatric Consultation  7/30/2024 2213 by Milan Barnard, RN  Outcome: Progressing  Flowsheets (Taken 7/30/2024 2209)  Effect of psychiatric condition will be minimized and patient will be protected from self harm:   Assess impact of patient’s symptoms on level of

## 2024-07-31 NOTE — TRANSITION OF CARE
[facility] Transition Record    Patient Name: Gaurav Ashraf  YOB: 1995   Medical Record Number: [id number]  Date of Admission: 7/30/2024  1:43 AM   Date of Discharge: 7/31/2024    Attending Provider: Alcon Vieira MD   Discharging Provider:    Marli Wiley APRN - CNP     To contact this individual call [phone] and ask the  to page.  If unavailable, ask to be transferred to Behavioral Health Provider on call.  A Behavioral Health Provider will be available on call 24/7 and during holidays.    Primary Care Provider: No primary care provider on file.    No Known Allergies    Reason for Admission: Gaurav Ashraf is a 28 y.o. male who presents to the emergency department after police were called.  Patient currently lives with his partner and apparently left a note about killing himself and partner called patient's mother who called the police and brought him here.  Patient states that his partner and him live together and are in an open relationship and he became jealous yesterday and actually cut his left leg.  These are all superficial wounds that he is unsure of his last tetanus vaccination.  States he has been feeling very jealous about this and then cut his left forearm today.  These again are all superficial.  Denies any physical pain.  He states he was very upset about what happened and left a note but never had a plan to hurt himself and currently does not have a plan to hurt himself.  He states he began thinking about all the people that he would actually hurt if he killed himself and states he would be unable to do this and does not want to hurt himself.  He states that he does have a long history of some auditory hallucinations when he is very stressed and will just hear noises and denies any change in this.  Denies visual hallucinations or homicidal ideation.  He states he used to see a therapist when he was younger but was never on any medication and does not have

## 2024-07-31 NOTE — BH NOTE
[facility]  Discharge Note    Pt discharged with followings belongings:   Dental Appliances: None  Vision - Corrective Lenses: Eyeglasses  Hearing Aid: None  Jewelry: None  Body Piercings Removed: N/A  Clothing: Pants, Shirt, Jacket/Coat, Footwear, Undergarments  Other Valuables: Wallet, Keys, Credit/Debit Card   Valuables  returned to patient. Patient educated on aftercare instructions: yes  .Patient verbalize understanding of AVS:  yes.    Status EXAM upon discharge:  Mental Status and Behavioral Exam  Normal: Yes  Level of Assistance: Independent/Self  Facial Expression: Brightened  Affect: Appropriate  Level of Consciousness: Alert  Frequency of Checks: 4 times per hour, close  Mood:Normal: Yes  Mood: Other (comment) (euthymic)  Motor Activity:Normal: Yes  Motor Activity: Increased  Eye Contact: Good  Observed Behavior: Cooperative, Friendly  Sexual Misconduct History: Current - no  Preception: [place] to person, [place] to time, [place] to place, [place] to situation  Attention:Normal: Yes  Attention: Unable to concentrate  Thought Processes: Unremarkable  Thought Content:Normal: Yes  Thought Content: Poverty of content  Depression Symptoms: No problems reported or observed.  Anxiety Symptoms: Generalized  Felisha Symptoms: No problems reported or observed.  Hallucinations: None  Delusions: No  Memory:Normal: Yes  Insight and Judgment: No  Insight and Judgment: Poor judgment    Tobacco Screening:  Practical Counseling, on admission, emily X, if applicable and completed (first 3 are required if patient doesn't refuse):            ( ) Recognizing danger situations (included triggers and roadblocks)                    ( ) Coping skills (new ways to manage stress,relaxation techniques, changing routine, distraction)                                                           ( ) Basic information about quitting (benefits of quitting, techniques in how to quit, available resources  ( ) Referral for

## 2024-07-31 NOTE — BH NOTE
Bridge Appointment completed: Reviewed Discharge Instructions with patient.    Patient verbalizes understanding and agreement with the discharge plan using the teachback method.         Vaccinations (emily X if applicable and completed):  ( ) Patient states already received influenza vaccine elsewhere  ( ) Patient received influenza vaccine during this hospitalization  ( ) Patient refused influenza vaccine at this time  (x ) Not offered

## 2024-07-31 NOTE — PLAN OF CARE
Problem: Anxiety  Goal: Will report anxiety at manageable levels  Description: INTERVENTIONS:  1. Administer medication as ordered  2. Teach and rehearse alternative coping skills  3. Provide emotional support with 1:1 interaction with staff  7/31/2024 1357 by Bronwyn Koenig RN  Outcome: Completed  7/31/2024 0945 by Bronwyn Koenig RN  Outcome: Progressing     Problem: Coping  Goal: Pt/Family able to verbalize concerns and demonstrate effective coping strategies  Description: INTERVENTIONS:  1. Assist patient/family to identify coping skills, available support systems and cultural and spiritual values  2. Provide emotional support, including active listening and acknowledgement of concerns of patient and caregivers  3. Reduce environmental stimuli, as able  4. Instruct patient/family in relaxation techniques, as appropriate  5. Assess for spiritual pain/suffering and initiate Spiritual Care, Psychosocial Clinical Specialist consults as needed  7/31/2024 1357 by Bronwyn Koenig RN  Outcome: Completed  7/31/2024 0945 by Bronwyn Koenig RN  Outcome: Progressing     Problem: Depression/Self Harm  Goal: Effect of psychiatric condition will be minimized and patient will be protected from self harm  Description: INTERVENTIONS:  1. Assess impact of patient's symptoms on level of functioning, self care needs and offer support as indicated  2. Assess patient/family knowledge of depression, impact on illness and need for teaching  3. Provide emotional support, presence and reassurance  4. Assess for possible suicidal thoughts or ideation. If patient expresses suicidal thoughts or statements do not leave alone, initiate Suicide Precautions, move to a room close to the nursing station and obtain sitter  5. Initiate consults as appropriate with Mental Health Professional, Spiritual Care, Psychosocial CNS, and consider a recommendation to the [facility] for a Psychiatric Consultation  7/31/2024 1357 by Bronwyn Koenig

## 2024-07-31 NOTE — GROUP NOTE
Group Therapy Note    Date: 7/31/2024    Group Start Time: 1100  Group End Time: 1145  Group Topic: Psychoeducation    [facility]    Denise Seymour,         Group Therapy Note    Attendees: 8    Psych Education session continued the topic of cognitive distortions from the previous group.  Participants discussed their takeaways from the previous group and then identified which cognitive distortion resonated with them the most.  Group also processed basic education on CBT.      Notes:  Pt was present and engaged across session. Attentive and shared during group discussion and receptive to information provided. Accepted handouts offered containing additional information on cognitive distortions and challenging negative self talk. Met goal for group.     Status After Intervention:  Improved    Participation Level: Active Listener and Interactive    Participation Quality: Appropriate, Attentive, Sharing, and Supportive      Speech:  normal      Thought Process/Content: Logical      Affective Functioning: Congruent      Mood: anxious      Level of consciousness:  Alert, Oriented x4, and Attentive      Response to Learning: Able to verbalize current knowledge/experience, Able to verbalize/acknowledge new learning, Capable of insight, and Progressing to goal      Endings: None Reported    Modes of Intervention: Education, Support, Socialization, Exploration, Clarifying, and Problem-solving      Discipline Responsible: Psychoeducational Specialist and Recreational Therapist      Signature:  Denise Seymour MA, CTRS

## 2024-07-31 NOTE — GROUP NOTE
Group Therapy Note    Date: 7/31/2024    Group Start Time: 1000  Group End Time: 1045  Group Topic: Cognitive Skills    [facility]    Jeaneth Galan LPC    Group members explored somatic symptoms for anxiety and depression. Members learned about cognitive distortions and explored personal connections. Group members were able to connect how cognitive distortions can influence/increase anxiety or depressive symptoms.     Group Therapy Note    Attendees: 8       Notes:  Luann was present throughout the duration of the group. Luann shared insight freely and identified numerous examples of personal connection to cognitive distortions. Luann supported other members.     Status After Intervention:  Improved    Participation Level: Active Listener and Interactive    Participation Quality: Appropriate, Attentive, Sharing, and Supportive      Speech:  normal      Thought Process/Content: Logical      Affective Functioning: Congruent      Mood: anxious      Level of consciousness:  Alert      Response to Learning: Able to verbalize current knowledge/experience, Able to verbalize/acknowledge new learning, Able to retain information, and Capable of insight      Endings: None Reported    Modes of Intervention: Education, Support, Socialization, and Exploration      Discipline Responsible: /Counselor      Signature:  Jeaneth Galan LPC

## 2024-07-31 NOTE — PLAN OF CARE
Problem: Anxiety  Goal: Will report anxiety at manageable levels  Description: INTERVENTIONS:  1. Administer medication as ordered  2. Teach and rehearse alternative coping skills  3. Provide emotional support with 1:1 interaction with staff  7/31/2024 0945 by Bronwyn Koenig RN  Outcome: Progressing  7/30/2024 2213 by Milan Barnard RN  Outcome: Progressing     Problem: Coping  Goal: Pt/Family able to verbalize concerns and demonstrate effective coping strategies  Description: INTERVENTIONS:  1. Assist patient/family to identify coping skills, available support systems and cultural and spiritual values  2. Provide emotional support, including active listening and acknowledgement of concerns of patient and caregivers  3. Reduce environmental stimuli, as able  4. Instruct patient/family in relaxation techniques, as appropriate  5. Assess for spiritual pain/suffering and initiate Spiritual Care, Psychosocial Clinical Specialist consults as needed  7/31/2024 0945 by Bronwyn Koenig RN  Outcome: Progressing  7/30/2024 2213 by Milan Barnard RN  Outcome: Progressing     Problem: Depression/Self Harm  Goal: Effect of psychiatric condition will be minimized and patient will be protected from self harm  Description: INTERVENTIONS:  1. Assess impact of patient's symptoms on level of functioning, self care needs and offer support as indicated  2. Assess patient/family knowledge of depression, impact on illness and need for teaching  3. Provide emotional support, presence and reassurance  4. Assess for possible suicidal thoughts or ideation. If patient expresses suicidal thoughts or statements do not leave alone, initiate Suicide Precautions, move to a room close to the nursing station and obtain sitter  5. Initiate consults as appropriate with Mental Health Professional, Spiritual Care, Psychosocial CNS, and consider a recommendation to the [facility] for a Psychiatric Consultation  7/31/2024 0945 by Bronwyn Koenig

## 2024-07-31 NOTE — PROGRESS NOTES
Group Therapy Note    Date: 7/30/2024  Start Time: 20:00  End Time:  21:00  Number of Participants: 9    Type of Group: Recreational  wrap up    Wellness Binder Information  Module Name:  /  Session Number:  /    Patient's Goal:  coping skills    Notes:  continuing to work on goal    Status After Intervention:  Unchanged    Participation Level: Active Listener and Interactive    Participation Quality: Appropriate, Attentive, and Sharing      Speech:  pressured      Thought Process/Content: Logical      Affective Functioning: Blunted      Mood: anxious      Level of consciousness:  Alert and Attentive      Response to Learning: Able to change behavior      Endings: None Reported    Modes of Intervention: Socialization and Problem-solving      Discipline Responsible: Behavorial Health Tech      Signature:  Cj Olson

## 2024-07-31 NOTE — DISCHARGE SUMMARY
Chloride 07/29/2024 100  99 - 110 mmol/L Final    CO2 07/29/2024 23  21 - 32 mmol/L Final    Anion Gap 07/29/2024 14  3 - 16 Final    Glucose 07/29/2024 121 (H)  70 - 99 mg/dL Final    BUN 07/29/2024 14  7 - 20 mg/dL Final    Creatinine 07/29/2024 1.0  0.9 - 1.3 mg/dL Final    Est, Glom Filt Rate 07/29/2024 >90  >60 Final    Comment: Pediatric calculator link  https://www.kidney.org/professionals/kdoqi/gfr_calculatorped  Effective Oct 3, 2022  These results are not intended for use in patients  <18 years of age.  eGFR results are calculated without  a race factor using the 2021 CKD-EPI equation.  Careful  clinical correlation is recommended, particularly when  comparing to results calculated using previous equations.  The CKD-EPI equation is less accurate in patients with  extremes of muscle mass, extra-renal metabolism of  creatinine, excessive creatinine ingestion, or following  therapy that affects renal tubular secretion.      Calcium 07/29/2024 9.7  8.3 - 10.6 mg/dL Final    Total Protein 07/29/2024 7.4  6.4 - 8.2 g/dL Final    Albumin 07/29/2024 4.7  3.4 - 5.0 g/dL Final    Albumin/Globulin Ratio 07/29/2024 1.7  1.1 - 2.2 Final    Total Bilirubin 07/29/2024 0.5  0.0 - 1.0 mg/dL Final    Alkaline Phosphatase 07/29/2024 94  40 - 129 U/L Final    ALT 07/29/2024 18  10 - 40 U/L Final    AST 07/29/2024 21  15 - 37 U/L Final    Color, UA 07/29/2024 DARK YELLOW (A)  Straw/Yellow Final    Clarity, UA 07/29/2024 CLOUDY (A)  Clear Final    Glucose, Ur 07/29/2024 Negative  Negative mg/dL Final    Bilirubin, Urine 07/29/2024 Negative  Negative Final    Ketones, Urine 07/29/2024 15 (A)  Negative mg/dL Final    Specific Gravity, UA 07/29/2024 1.025  1.005 - 1.030 Final    Blood, Urine 07/29/2024 Negative  Negative Final    pH, Urine 07/29/2024 6.0  5.0 - 8.0 Final    Protein, UA 07/29/2024 TRACE (A)  Negative mg/dL Final    Urobilinogen, Urine 07/29/2024 1.0  <2.0 E.U./dL Final    Nitrite, Urine 07/29/2024 Negative

## 2024-08-02 ENCOUNTER — FOLLOWUP TELEPHONE ENCOUNTER (OUTPATIENT)
Dept: PSYCHIATRY | Age: 29
End: 2024-08-02

## 2024-08-05 ENCOUNTER — FOLLOWUP TELEPHONE ENCOUNTER (OUTPATIENT)
Dept: PSYCHIATRY | Age: 29
End: 2024-08-05

## 2024-08-07 ENCOUNTER — FOLLOWUP TELEPHONE ENCOUNTER (OUTPATIENT)
Dept: PSYCHIATRY | Age: 29
End: 2024-08-07

## 2025-05-24 ENCOUNTER — HOSPITAL ENCOUNTER (OUTPATIENT)
Dept: GENERAL RADIOLOGY | Age: 30
Discharge: HOME | End: 2025-05-24
Payer: COMMERCIAL

## 2025-05-24 ENCOUNTER — HOSPITAL ENCOUNTER (OUTPATIENT)
Age: 30
Discharge: HOME | End: 2025-05-24
Payer: COMMERCIAL

## 2025-05-24 DIAGNOSIS — R52 PAIN: ICD-10-CM

## 2025-05-24 PROCEDURE — 73140 X-RAY EXAM OF FINGER(S): CPT
